# Patient Record
Sex: MALE | Race: WHITE | NOT HISPANIC OR LATINO | Employment: FULL TIME | URBAN - METROPOLITAN AREA
[De-identification: names, ages, dates, MRNs, and addresses within clinical notes are randomized per-mention and may not be internally consistent; named-entity substitution may affect disease eponyms.]

---

## 2019-07-22 ENCOUNTER — TELEPHONE (OUTPATIENT)
Dept: FAMILY MEDICINE CLINIC | Facility: CLINIC | Age: 39
End: 2019-07-22

## 2019-07-22 ENCOUNTER — OFFICE VISIT (OUTPATIENT)
Dept: FAMILY MEDICINE CLINIC | Facility: CLINIC | Age: 39
End: 2019-07-22

## 2019-07-22 VITALS
HEART RATE: 68 BPM | WEIGHT: 224 LBS | SYSTOLIC BLOOD PRESSURE: 104 MMHG | DIASTOLIC BLOOD PRESSURE: 74 MMHG | HEIGHT: 73 IN | BODY MASS INDEX: 29.69 KG/M2 | RESPIRATION RATE: 18 BRPM | TEMPERATURE: 97.4 F

## 2019-07-22 DIAGNOSIS — G25.81 RESTLESS LEGS: ICD-10-CM

## 2019-07-22 DIAGNOSIS — L91.8 SKIN TAG: ICD-10-CM

## 2019-07-22 DIAGNOSIS — E55.9 VITAMIN D DEFICIENCY: ICD-10-CM

## 2019-07-22 DIAGNOSIS — Z13.228 SCREENING FOR METABOLIC DISORDER: ICD-10-CM

## 2019-07-22 DIAGNOSIS — Z13.220 ENCOUNTER FOR LIPID SCREENING FOR CARDIOVASCULAR DISEASE: ICD-10-CM

## 2019-07-22 DIAGNOSIS — Z13.6 ENCOUNTER FOR LIPID SCREENING FOR CARDIOVASCULAR DISEASE: ICD-10-CM

## 2019-07-22 DIAGNOSIS — Z00.00 ENCOUNTER FOR WELL ADULT EXAM WITHOUT ABNORMAL FINDINGS: Primary | ICD-10-CM

## 2019-07-22 PROCEDURE — 99385 PREV VISIT NEW AGE 18-39: CPT | Performed by: PHYSICIAN ASSISTANT

## 2019-07-22 NOTE — PROGRESS NOTES
Chief Complaint   Patient presents with   • Establish Care   • Biometric Screening     for work   • Skin Problem     on back   • Restless Legs Syndrome     ? legs kick at night.        Subjective   The patient is a 38 y.o. male who presents to establish care. Needs physical for work/ insurance. Has not had PCP since childhood.  No significant hx other than acid reflux. Did take Prilosec in the past, doing well over last couple of years. Not needing medication.   Has been dealing with restless legs issue  For several years. Has to kick legs. Will feel tight. Does not feel numb, tingling or cramping   No hx of low back issues. No chronic pain with back.    Nutrition:  well balanced, does struggle with sweats and fast food intake.   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 Very active during practices   Sleep:  Tries to get 7-8 hours per night. Does snore at night. RLS does make it hard to sleep at times. Has  at home      Past Medical History,Medications, Allergies reviewed.     HPI  Has had skin tag on back for many years he would like to have removed. Not changed in size. Has had since high school     Review of Systems   Constitutional: Negative.  Negative for chills, diaphoresis, fatigue and fever.   HENT: Negative.  Negative for congestion, ear discharge, ear pain, hearing loss, nosebleeds, postnasal drip, sinus pressure, sneezing and sore throat.    Eyes: Negative.    Respiratory: Negative.  Negative for cough, chest tightness, shortness of breath and wheezing.    Cardiovascular: Negative.  Negative for chest pain, palpitations and leg swelling.   Gastrointestinal: Negative for abdominal distention, abdominal pain, blood in stool, constipation, diarrhea, nausea and vomiting.   Genitourinary: Negative.  Negative for difficulty urinating, dysuria, flank pain, frequency, hematuria and urgency.   Musculoskeletal: Negative.  Negative for arthralgias, back pain, gait problem, joint swelling,  "myalgias, neck pain and neck stiffness.   Skin: Negative.  Negative for color change, pallor, rash and wound.        Skin tag on back    Neurological: Negative for dizziness, syncope, weakness, light-headedness, numbness and headaches.        Restless legs       Objective     /74   Pulse 68   Temp 97.4 °F (36.3 °C)   Resp 18   Ht 185.4 cm (73\")   Wt 102 kg (224 lb)   BMI 29.55 kg/m²     Physical Exam   Constitutional: He is oriented to person, place, and time. He appears well-developed and well-nourished.   HENT:   Head: Normocephalic and atraumatic.   Right Ear: External ear normal.   Left Ear: External ear normal.   Nose: Nose normal.   Mouth/Throat: Oropharynx is clear and moist. No oropharyngeal exudate.   Eyes: Conjunctivae are normal.   Neck: Normal range of motion. Neck supple. No tracheal deviation present. No thyromegaly present.   Cardiovascular: Normal rate, regular rhythm, normal heart sounds and intact distal pulses. Exam reveals no gallop and no friction rub.   No murmur heard.  Pulmonary/Chest: Effort normal and breath sounds normal. No respiratory distress. He has no wheezes. He has no rales. He exhibits no tenderness.   Abdominal: Soft. Bowel sounds are normal. He exhibits no distension and no mass. There is no tenderness. There is no rebound and no guarding. No hernia.   Lymphadenopathy:     He has no cervical adenopathy.   Neurological: He is alert and oriented to person, place, and time.   Skin: Skin is warm and dry.   1 cm flesh colored skin tag on R mid-back    Psychiatric: He has a normal mood and affect. His behavior is normal. Judgment and thought content normal.   Nursing note and vitals reviewed.    Cryotherapy, Skin Lesion  Date/Time: 7/22/2019 2:34 PM  Performed by: Lily Vides PA  Authorized by: Lily Vides PA   Consent: Verbal consent obtained.  Risks and benefits: risks, benefits and alternatives were discussed  Consent given by: patient  Patient " understanding: patient states understanding of the procedure being performed  Patient consent: the patient's understanding of the procedure matches consent given  Procedure consent: procedure consent matches procedure scheduled  Patient tolerance: Patient tolerated the procedure well with no immediate complications  Comments: Large skin tag on R mid back frozen and thawed X 3             Assessment/Plan     1. Encounter for well adult exam without abnormal findings    2. Restless legs  - Vitamin B12  - Iron Profile  - Magnesium  - TSH  - T4, free    3. Screening for metabolic disorder  - CBC & Differential  - Comprehensive metabolic panel    4. Encounter for lipid screening for cardiovascular disease  - Lipid Panel    5. Vitamin D deficiency  - Vitamin D 25 Hydroxy    6. Skin tag  - Cryotherapy, Skin Lesion    Check labs as noted for restless leg symptoms. May try Requip if no obvious source   Cryo for skin tag. F/U if not fully resolved after treatment. Pt agrees.     Counseling was given to patient for the following topics: instructions for management, risk factor reductions, patient and family education, impressions and risks and benefits of treatment options . Total time of the encounter was 30 minutes and 15 minutes was spend counseling.      CHELE Ga

## 2019-07-22 NOTE — TELEPHONE ENCOUNTER
Please let patient know I coded appointment as a new patient physical so this should suffice for insurance. PRAKASH

## 2019-07-22 NOTE — TELEPHONE ENCOUNTER
----- Message from Matt Collado sent at 7/22/2019  2:38 PM EDT -----  Contact: [facility]  PATIENT NEEDS HIS APPT TODAY PUT IN AS A PHYSICAL AND NOT NEW PATIENT  - DUE TO HIS INSURANCE.  PT THOUGHT IT WAS GOING TO BE DONE AS PHYSICAL BUT JUST WANTED TO MAKE SURE    PT CALL BACK [phone]

## 2019-07-24 LAB
25(OH)D3+25(OH)D2 SERPL-MCNC: 45.6 NG/ML (ref 30–100)
ALBUMIN SERPL-MCNC: 4.9 G/DL (ref 3.5–5.2)
ALBUMIN/GLOB SERPL: 2.5 G/DL
ALP SERPL-CCNC: 83 U/L (ref 39–117)
ALT SERPL-CCNC: 48 U/L (ref 1–41)
AST SERPL-CCNC: 26 U/L (ref 1–40)
BASOPHILS # BLD AUTO: 0.02 10*3/MM3 (ref 0–0.2)
BASOPHILS NFR BLD AUTO: 0.4 % (ref 0–1.5)
BILIRUB SERPL-MCNC: 0.8 MG/DL (ref 0.2–1.2)
BUN SERPL-MCNC: 15 MG/DL (ref 6–20)
BUN/CREAT SERPL: 12 (ref 7–25)
CALCIUM SERPL-MCNC: 9.4 MG/DL (ref 8.6–10.5)
CHLORIDE SERPL-SCNC: 106 MMOL/L (ref 98–107)
CHOLEST SERPL-MCNC: 164 MG/DL (ref 0–200)
CO2 SERPL-SCNC: 27.2 MMOL/L (ref 22–29)
CREAT SERPL-MCNC: 1.25 MG/DL (ref 0.76–1.27)
EOSINOPHIL # BLD AUTO: 0.09 10*3/MM3 (ref 0–0.4)
EOSINOPHIL NFR BLD AUTO: 1.7 % (ref 0.3–6.2)
ERYTHROCYTE [DISTWIDTH] IN BLOOD BY AUTOMATED COUNT: 12.7 % (ref 12.3–15.4)
GLOBULIN SER CALC-MCNC: 2 GM/DL
GLUCOSE SERPL-MCNC: 97 MG/DL (ref 65–99)
HCT VFR BLD AUTO: 54.8 % (ref 37.5–51)
HDLC SERPL-MCNC: 32 MG/DL (ref 40–60)
HGB BLD-MCNC: 17.8 G/DL (ref 13–17.7)
IMM GRANULOCYTES # BLD AUTO: 0.02 10*3/MM3 (ref 0–0.05)
IMM GRANULOCYTES NFR BLD AUTO: 0.4 % (ref 0–0.5)
IRON SATN MFR SERPL: 41 % (ref 20–50)
IRON SERPL-MCNC: 131 MCG/DL (ref 59–158)
LDLC SERPL CALC-MCNC: 89 MG/DL (ref 0–100)
LYMPHOCYTES # BLD AUTO: 1.85 10*3/MM3 (ref 0.7–3.1)
LYMPHOCYTES NFR BLD AUTO: 34.6 % (ref 19.6–45.3)
MAGNESIUM SERPL-MCNC: 2.2 MG/DL (ref 1.6–2.6)
MCH RBC QN AUTO: 29.7 PG (ref 26.6–33)
MCHC RBC AUTO-ENTMCNC: 32.5 G/DL (ref 31.5–35.7)
MCV RBC AUTO: 91.3 FL (ref 79–97)
MONOCYTES # BLD AUTO: 0.5 10*3/MM3 (ref 0.1–0.9)
MONOCYTES NFR BLD AUTO: 9.4 % (ref 5–12)
NEUTROPHILS # BLD AUTO: 2.86 10*3/MM3 (ref 1.7–7)
NEUTROPHILS NFR BLD AUTO: 53.5 % (ref 42.7–76)
NRBC BLD AUTO-RTO: 0 /100 WBC (ref 0–0.2)
PLATELET # BLD AUTO: 230 10*3/MM3 (ref 140–450)
POTASSIUM SERPL-SCNC: 4.5 MMOL/L (ref 3.5–5.2)
PROT SERPL-MCNC: 6.9 G/DL (ref 6–8.5)
RBC # BLD AUTO: 6 10*6/MM3 (ref 4.14–5.8)
SODIUM SERPL-SCNC: 144 MMOL/L (ref 136–145)
T4 FREE SERPL-MCNC: 1.19 NG/DL (ref 0.93–1.7)
TIBC SERPL-MCNC: 321 MCG/DL
TRIGL SERPL-MCNC: 216 MG/DL (ref 0–150)
TSH SERPL DL<=0.005 MIU/L-ACNC: 1.76 UIU/ML (ref 0.45–4.5)
UIBC SERPL-MCNC: 190 MCG/DL (ref 112–346)
VIT B12 SERPL-MCNC: 372 PG/ML (ref 211–946)
VLDLC SERPL CALC-MCNC: 43.2 MG/DL
WBC # BLD AUTO: 5.34 10*3/MM3 (ref 3.4–10.8)

## 2020-10-22 ENCOUNTER — TELEPHONE (OUTPATIENT)
Dept: FAMILY MEDICINE CLINIC | Facility: CLINIC | Age: 40
End: 2020-10-22

## 2020-10-22 DIAGNOSIS — K92.1 BLOODY STOOLS: Primary | ICD-10-CM

## 2020-10-22 NOTE — TELEPHONE ENCOUNTER
Patient's wife Jocy requesting a call back. She stated the patient is wanting a referral to see a gastroenterologist. She stated the patient has blood in his stool occasionally when having bowel movements. This has been going on for several months.    Call back number is [phone]

## 2020-10-22 NOTE — TELEPHONE ENCOUNTER
Referral placed. This can be common with hemorrhoids. Encourage he avoid straining (propping feet up on small stool can help), avoid staying on commode for long periods of time and increase fiber in diet. GI can do thorough evaluation as well to make sure not related to any inflammatory changes, tear, polyp etc. PRAKASH

## 2020-11-11 RX ORDER — SODIUM, POTASSIUM,MAG SULFATES 17.5-3.13G
2 SOLUTION, RECONSTITUTED, ORAL ORAL TAKE AS DIRECTED
Qty: 354 ML | Refills: 0 | Status: SHIPPED | OUTPATIENT
Start: 2020-11-11

## 2020-11-16 ENCOUNTER — APPOINTMENT (OUTPATIENT)
Dept: PREADMISSION TESTING | Facility: HOSPITAL | Age: 40
End: 2020-11-16

## 2020-11-16 PROCEDURE — C9803 HOPD COVID-19 SPEC COLLECT: HCPCS

## 2020-11-16 PROCEDURE — U0004 COV-19 TEST NON-CDC HGH THRU: HCPCS

## 2020-11-17 LAB — SARS-COV-2 RNA RESP QL NAA+PROBE: NOT DETECTED

## 2020-11-19 ENCOUNTER — OUTSIDE FACILITY SERVICE (OUTPATIENT)
Dept: GASTROENTEROLOGY | Facility: CLINIC | Age: 40
End: 2020-11-19

## 2020-11-19 PROCEDURE — 45378 DIAGNOSTIC COLONOSCOPY: CPT | Performed by: INTERNAL MEDICINE

## 2023-09-15 ENCOUNTER — OFFICE VISIT (OUTPATIENT)
Dept: FAMILY MEDICINE CLINIC | Facility: CLINIC | Age: 43
End: 2023-09-15
Payer: COMMERCIAL

## 2023-09-15 VITALS
OXYGEN SATURATION: 98 % | SYSTOLIC BLOOD PRESSURE: 108 MMHG | DIASTOLIC BLOOD PRESSURE: 70 MMHG | TEMPERATURE: 98.4 F | RESPIRATION RATE: 18 BRPM | WEIGHT: 257 LBS | HEIGHT: 74 IN | BODY MASS INDEX: 32.98 KG/M2 | HEART RATE: 66 BPM

## 2023-09-15 DIAGNOSIS — L65.9 ALOPECIA: ICD-10-CM

## 2023-09-15 DIAGNOSIS — Z83.42 FAMILY HISTORY OF HYPERCHOLESTEROLEMIA: ICD-10-CM

## 2023-09-15 DIAGNOSIS — Z00.00 ANNUAL PHYSICAL EXAM: Primary | ICD-10-CM

## 2023-09-15 PROCEDURE — 99386 PREV VISIT NEW AGE 40-64: CPT | Performed by: FAMILY MEDICINE

## 2023-09-15 NOTE — PROGRESS NOTES
Subjective   Wang Smith is a 42 y.o. male.     History of Present Illness     Wang Smith 42 y.o. male who presents for yearly preventive exam.  His overall health is: good  He exercises active with kids..  Last colon screening was colonoscopy 2 years ago without abnormalities.  He does not see his dentist regularly  His diet is  fast food on occasion.  Has 4 kids who are busy!  He describes his alcohol intake as none  He knows what his cholesterol is No  He is sexually active  He does not have ED or other bedroom issues  Does patient smoke No       Sometimes his legs kick at night  This is often  Does not bother him and he sleep well        The following portions of the patient's history were reviewed and updated as appropriate: allergies, current medications, past family history, past medical history, past social history, past surgical history, and problem list.    Review of Systems   Constitutional: Negative.    Respiratory: Negative.  Negative for shortness of breath.    Cardiovascular: Negative.  Negative for chest pain.   Gastrointestinal:  Negative for constipation and diarrhea.   Psychiatric/Behavioral: Negative.  The patient is not nervous/anxious.      Objective   Physical Exam  Vitals and nursing note reviewed.   Constitutional:       General: He is not in acute distress.     Appearance: Normal appearance. He is well-developed.   HENT:      Head: Normocephalic and atraumatic.      Right Ear: Tympanic membrane, ear canal and external ear normal.      Left Ear: Tympanic membrane, ear canal and external ear normal.      Nose: Nose normal.   Eyes:      Extraocular Movements: Extraocular movements intact.      Conjunctiva/sclera: Conjunctivae normal.   Neck:      Thyroid: No thyromegaly.   Cardiovascular:      Rate and Rhythm: Normal rate and regular rhythm.      Heart sounds: Normal heart sounds. No murmur heard.  Pulmonary:      Effort: Pulmonary effort is normal. No respiratory distress.      Breath  sounds: Normal breath sounds.   Abdominal:      General: Bowel sounds are normal. There is no distension.      Palpations: Abdomen is soft.      Tenderness: There is no abdominal tenderness. There is no guarding or rebound.   Musculoskeletal:      Cervical back: Normal range of motion and neck supple.   Lymphadenopathy:      Cervical: No cervical adenopathy.   Skin:     General: Skin is warm and dry.   Neurological:      Mental Status: He is alert and oriented to person, place, and time.   Psychiatric:         Mood and Affect: Mood normal.         Behavior: Behavior normal.         Thought Content: Thought content normal.         Judgment: Judgment normal.       Assessment & Plan   Diagnoses and all orders for this visit:    1. Annual physical exam (Primary)    2. Alopecia    3. Family history of hypercholesterolemia  -     CBC & Differential  -     Comprehensive Metabolic Panel  -     Lipid Panel      Counseled about well male care including diet and exercise as BMI is 33    Will check labs and f/u pending results    Roselia for hair loss